# Patient Record
Sex: FEMALE | Race: WHITE | NOT HISPANIC OR LATINO | Employment: FULL TIME | URBAN - METROPOLITAN AREA
[De-identification: names, ages, dates, MRNs, and addresses within clinical notes are randomized per-mention and may not be internally consistent; named-entity substitution may affect disease eponyms.]

---

## 2024-11-06 ENCOUNTER — OFFICE VISIT (OUTPATIENT)
Dept: INTERNAL MEDICINE | Facility: CLINIC | Age: 29
End: 2024-11-06
Payer: COMMERCIAL

## 2024-11-06 ENCOUNTER — LAB VISIT (OUTPATIENT)
Dept: LAB | Facility: HOSPITAL | Age: 29
End: 2024-11-06
Attending: FAMILY MEDICINE
Payer: COMMERCIAL

## 2024-11-06 VITALS
OXYGEN SATURATION: 97 % | SYSTOLIC BLOOD PRESSURE: 106 MMHG | WEIGHT: 148.81 LBS | DIASTOLIC BLOOD PRESSURE: 80 MMHG | HEIGHT: 67 IN | HEART RATE: 77 BPM | BODY MASS INDEX: 23.36 KG/M2

## 2024-11-06 DIAGNOSIS — Z13.220 SCREENING FOR LIPOID DISORDERS: ICD-10-CM

## 2024-11-06 DIAGNOSIS — Z00.00 ROUTINE GENERAL MEDICAL EXAMINATION AT A HEALTH CARE FACILITY: Primary | ICD-10-CM

## 2024-11-06 DIAGNOSIS — Z13.29 THYROID DISORDER SCREEN: ICD-10-CM

## 2024-11-06 DIAGNOSIS — Z13.1 DIABETES MELLITUS SCREENING: ICD-10-CM

## 2024-11-06 DIAGNOSIS — G25.3 MYOCLONUS: ICD-10-CM

## 2024-11-06 DIAGNOSIS — G43.009 MIGRAINE WITHOUT AURA AND WITHOUT STATUS MIGRAINOSUS, NOT INTRACTABLE: ICD-10-CM

## 2024-11-06 DIAGNOSIS — Z11.59 NEED FOR HEPATITIS C SCREENING TEST: ICD-10-CM

## 2024-11-06 DIAGNOSIS — G40.909 NONINTRACTABLE EPILEPSY WITHOUT STATUS EPILEPTICUS, UNSPECIFIED EPILEPSY TYPE: ICD-10-CM

## 2024-11-06 DIAGNOSIS — L40.9 PSORIASIS: ICD-10-CM

## 2024-11-06 DIAGNOSIS — Z00.00 ROUTINE GENERAL MEDICAL EXAMINATION AT A HEALTH CARE FACILITY: ICD-10-CM

## 2024-11-06 LAB
ALBUMIN SERPL BCP-MCNC: 4.1 G/DL (ref 3.5–5.2)
ALP SERPL-CCNC: 35 U/L (ref 40–150)
ALT SERPL W/O P-5'-P-CCNC: 20 U/L (ref 10–44)
ANION GAP SERPL CALC-SCNC: 13 MMOL/L (ref 8–16)
AST SERPL-CCNC: 22 U/L (ref 10–40)
BASOPHILS # BLD AUTO: 0.05 K/UL (ref 0–0.2)
BASOPHILS NFR BLD: 0.7 % (ref 0–1.9)
BILIRUB SERPL-MCNC: 0.4 MG/DL (ref 0.1–1)
BUN SERPL-MCNC: 16 MG/DL (ref 6–20)
CALCIUM SERPL-MCNC: 10.4 MG/DL (ref 8.7–10.5)
CHLORIDE SERPL-SCNC: 103 MMOL/L (ref 95–110)
CHOLEST SERPL-MCNC: 216 MG/DL (ref 120–199)
CHOLEST/HDLC SERPL: 2.4 {RATIO} (ref 2–5)
CO2 SERPL-SCNC: 24 MMOL/L (ref 23–29)
CREAT SERPL-MCNC: 0.8 MG/DL (ref 0.5–1.4)
DIFFERENTIAL METHOD BLD: NORMAL
EOSINOPHIL # BLD AUTO: 0.1 K/UL (ref 0–0.5)
EOSINOPHIL NFR BLD: 1.3 % (ref 0–8)
ERYTHROCYTE [DISTWIDTH] IN BLOOD BY AUTOMATED COUNT: 12.3 % (ref 11.5–14.5)
EST. GFR  (NO RACE VARIABLE): >60 ML/MIN/1.73 M^2
ESTIMATED AVG GLUCOSE: 100 MG/DL (ref 68–131)
GLUCOSE SERPL-MCNC: 84 MG/DL (ref 70–110)
HBA1C MFR BLD: 5.1 % (ref 4–5.6)
HCT VFR BLD AUTO: 42.7 % (ref 37–48.5)
HCV AB SERPL QL IA: NORMAL
HDLC SERPL-MCNC: 91 MG/DL (ref 40–75)
HDLC SERPL: 42.1 % (ref 20–50)
HGB BLD-MCNC: 14.1 G/DL (ref 12–16)
IMM GRANULOCYTES # BLD AUTO: 0.02 K/UL (ref 0–0.04)
IMM GRANULOCYTES NFR BLD AUTO: 0.3 % (ref 0–0.5)
LDLC SERPL CALC-MCNC: 112.2 MG/DL (ref 63–159)
LYMPHOCYTES # BLD AUTO: 2.5 K/UL (ref 1–4.8)
LYMPHOCYTES NFR BLD: 34.8 % (ref 18–48)
MCH RBC QN AUTO: 29.1 PG (ref 27–31)
MCHC RBC AUTO-ENTMCNC: 33 G/DL (ref 32–36)
MCV RBC AUTO: 88 FL (ref 82–98)
MONOCYTES # BLD AUTO: 0.5 K/UL (ref 0.3–1)
MONOCYTES NFR BLD: 7.1 % (ref 4–15)
NEUTROPHILS # BLD AUTO: 3.9 K/UL (ref 1.8–7.7)
NEUTROPHILS NFR BLD: 55.8 % (ref 38–73)
NONHDLC SERPL-MCNC: 125 MG/DL
NRBC BLD-RTO: 0 /100 WBC
PLATELET # BLD AUTO: 336 K/UL (ref 150–450)
PMV BLD AUTO: 10.3 FL (ref 9.2–12.9)
POTASSIUM SERPL-SCNC: 4.5 MMOL/L (ref 3.5–5.1)
PROT SERPL-MCNC: 8 G/DL (ref 6–8.4)
RBC # BLD AUTO: 4.84 M/UL (ref 4–5.4)
SODIUM SERPL-SCNC: 140 MMOL/L (ref 136–145)
TRIGL SERPL-MCNC: 64 MG/DL (ref 30–150)
TSH SERPL DL<=0.005 MIU/L-ACNC: 0.8 UIU/ML (ref 0.4–4)
WBC # BLD AUTO: 7.06 K/UL (ref 3.9–12.7)

## 2024-11-06 PROCEDURE — 3008F BODY MASS INDEX DOCD: CPT | Mod: CPTII,S$GLB,, | Performed by: FAMILY MEDICINE

## 2024-11-06 PROCEDURE — 1160F RVW MEDS BY RX/DR IN RCRD: CPT | Mod: CPTII,S$GLB,, | Performed by: FAMILY MEDICINE

## 2024-11-06 PROCEDURE — 3044F HG A1C LEVEL LT 7.0%: CPT | Mod: CPTII,S$GLB,, | Performed by: FAMILY MEDICINE

## 2024-11-06 PROCEDURE — 99385 PREV VISIT NEW AGE 18-39: CPT | Mod: S$GLB,,, | Performed by: FAMILY MEDICINE

## 2024-11-06 PROCEDURE — 85025 COMPLETE CBC W/AUTO DIFF WBC: CPT | Performed by: FAMILY MEDICINE

## 2024-11-06 PROCEDURE — 80061 LIPID PANEL: CPT | Performed by: FAMILY MEDICINE

## 2024-11-06 PROCEDURE — 80053 COMPREHEN METABOLIC PANEL: CPT | Performed by: FAMILY MEDICINE

## 2024-11-06 PROCEDURE — 83036 HEMOGLOBIN GLYCOSYLATED A1C: CPT | Performed by: FAMILY MEDICINE

## 2024-11-06 PROCEDURE — 99999 PR PBB SHADOW E&M-EST. PATIENT-LVL IV: CPT | Mod: PBBFAC,,, | Performed by: FAMILY MEDICINE

## 2024-11-06 PROCEDURE — 36415 COLL VENOUS BLD VENIPUNCTURE: CPT | Performed by: FAMILY MEDICINE

## 2024-11-06 PROCEDURE — 3079F DIAST BP 80-89 MM HG: CPT | Mod: CPTII,S$GLB,, | Performed by: FAMILY MEDICINE

## 2024-11-06 PROCEDURE — 3074F SYST BP LT 130 MM HG: CPT | Mod: CPTII,S$GLB,, | Performed by: FAMILY MEDICINE

## 2024-11-06 PROCEDURE — 1159F MED LIST DOCD IN RCRD: CPT | Mod: CPTII,S$GLB,, | Performed by: FAMILY MEDICINE

## 2024-11-06 PROCEDURE — 86803 HEPATITIS C AB TEST: CPT | Performed by: FAMILY MEDICINE

## 2024-11-06 PROCEDURE — 84443 ASSAY THYROID STIM HORMONE: CPT | Performed by: FAMILY MEDICINE

## 2024-11-06 RX ORDER — RIZATRIPTAN BENZOATE 10 MG/1
TABLET ORAL
COMMUNITY

## 2024-11-06 RX ORDER — IXEKIZUMAB 80 MG/ML
INJECTION, SOLUTION SUBCUTANEOUS
COMMUNITY

## 2024-11-06 NOTE — PROGRESS NOTES
Subjective:       Patient ID: Deana Mixon is a 29 y.o. female.    Chief Complaint: Annual Exam      History of Present Illness    Patient presents to clinic today for annual physical exam.  - Patient reports a history of psoriasis and is under the care of [facility]. She recently underwent TB testing in preparation for initiating Taltz (ixekizumab) treatment. Patient expresses concern about potential future health risks, particularly diabetes, due to a diagnosis of hypoglycemia at age 9. She has been reducing her caffeine intake, specifically energy drinks, due to adverse effects when consuming them without substantial food. Patient drinks V8 energy drinks and occasionally Edi Nu, with an average caffeine intake of 80 mg every other day or every 2 days. She notes that consumption on an empty stomach leads to adverse effects within 30 minutes to an hour. Patient has a history of migraines, which are currently well-controlled. She no longer requires preventative medication for migraines and only uses rizatriptan as needed. Patient discontinued Topamax due to side effects and has instead focused on identifying and managing her migraine triggers. She denies any current seizures, although she has a history of being at risk for them. She is followed by Neurology at [facility].  Patient is otherwise without concerns today.      Review of Systems   Constitutional:  Negative for activity change and unexpected weight change.   HENT:  Negative for hearing loss, rhinorrhea and trouble swallowing.    Eyes:  Negative for discharge and visual disturbance.   Respiratory:  Negative for chest tightness and wheezing.    Cardiovascular:  Negative for chest pain and palpitations.   Gastrointestinal:  Negative for blood in stool, constipation, diarrhea and vomiting.   Endocrine: Negative for polydipsia and polyuria.   Genitourinary:  Negative for difficulty  urinating, dysuria, hematuria and menstrual problem.   Musculoskeletal:  Negative for arthralgias, joint swelling and neck pain.   Neurological:  Negative for weakness and headaches.   Psychiatric/Behavioral:  Negative for confusion and dysphoric mood.          Objective:      Physical Exam  Vitals reviewed.   Constitutional:       General: She is not in acute distress.     Appearance: Normal appearance. She is well-developed.   HENT:      Head: Normocephalic and atraumatic.      Right Ear: Tympanic membrane, ear canal and external ear normal.      Left Ear: Tympanic membrane, ear canal and external ear normal.      Nose: Nose normal. No mucosal edema or rhinorrhea.      Mouth/Throat:      Pharynx: Uvula midline.   Eyes:      General: Lids are normal. No scleral icterus.     Extraocular Movements: Extraocular movements intact.      Conjunctiva/sclera: Conjunctivae normal.      Pupils: Pupils are equal, round, and reactive to light.   Neck:      Thyroid: No thyromegaly.   Cardiovascular:      Rate and Rhythm: Normal rate and regular rhythm.      Heart sounds: No murmur heard.     No friction rub. No gallop.   Pulmonary:      Effort: Pulmonary effort is normal.      Breath sounds: Normal breath sounds. No wheezing, rhonchi or rales.   Abdominal:      General: Bowel sounds are normal. There is no distension.      Palpations: Abdomen is soft. There is no mass.      Tenderness: There is no abdominal tenderness.   Musculoskeletal:         General: Normal range of motion.      Cervical back: Normal range of motion and neck supple.   Lymphadenopathy:      Cervical: No cervical adenopathy.   Skin:     General: Skin is warm and dry.      Findings: No lesion or rash.      Nails: There is no clubbing.   Neurological:      Mental Status: She is alert and oriented to person, place, and time.      Cranial Nerves: No cranial nerve deficit.      Sensory: No sensory deficit.      Gait: Gait normal.   Psychiatric:         Mood and  Affect: Mood and affect normal.         Assessment:       1. Routine general medical examination at a health care facility    2. Psoriasis    3. Nonintractable epilepsy without status epilepticus, unspecified epilepsy type    4. Myoclonus    5. Migraine without aura and without status migrainosus, not intractable    6. Thyroid disorder screen    7. Screening for lipoid disorders    8. Diabetes mellitus screening    9. Need for hepatitis C screening test        Plan:   1. Routine general medical examination at a health care facility  -     Comprehensive Metabolic Panel; Future; Expected date: 11/06/2024  -     CBC Auto Differential; Future; Expected date: 11/06/2024    2. Psoriasis  Overview:  Followed by Dermatology, Dr. Braxton @ [facility], continue current treatment plan       3. Nonintractable epilepsy without status epilepticus, unspecified epilepsy type  Overview:  Followed by Neurology @ [facility], continue current treatment plan     Patient denies seizure activity, but reports abnormal EEG in past      4. Myoclonus  Overview:  Followed by Neurology @ [facility], continue current treatment plan       5. Migraine without aura and without status migrainosus, not intractable  Overview:  Followed by Neurology @ [facility], continue current treatment plan       6. Thyroid disorder screen  -     TSH; Future; Expected date: 11/06/2024    7. Screening for lipoid disorders  -     Lipid Panel; Future; Expected date: 11/06/2024    8. Diabetes mellitus screening  -     Hemoglobin A1C; Future; Expected date: 11/06/2024    9. Need for hepatitis C screening test  -     Hepatitis C Antibody; Future; Expected date: 11/06/2024      PREVENTIVE CARE:   Ordered comprehensive labs.   Inquired about the patient's flu and COVID vaccination status.   Inquired about the patient's most recent Pap smear.   Provided the patient with information on maintaining a healthy  "lifestyle.    BLOOD SUGAR MANAGEMENT:   Evaluated patient's caffeine and energy drink consumption, considering its impact on blood sugar.   Explained the impact of sugary drinks, including those with natural sugars, on blood sugar.   Discussed the effect of artificial sweeteners on insulin response, even in calorie-free drinks.   Educated on the principles of intermittent fasting and the importance of avoiding sweetened beverages during fasting periods.   Patient to consume energy drinks with substantial meals to prevent blood sugar crashes.   Recommend avoiding caloric beverages between meals to prevent insulin spikes.   Patient can drink water, black tea, or black coffee between meals instead of sweetened beverages.    OTHER INSTRUCTIONS:   Provided information on the "6 pillars of a healthy lifestyle" via a document link in the discharge instructions.    Patient expressed understanding and agreement with plan.  Female health screenings per OB/GYN- Dr. Roberts, Release signed for records.    Health Maintenance reviewed/updated.    Follow up in about 1 year (around 11/6/2025), or if symptoms worsen or fail to improve, for annual with Sasha HITCHCOCK, labs today, NIKIA:pap (Dr. Becky Roberts).    This note was generated with the assistance of ambient listening technology. I attest to having reviewed and edited the generated note for accuracy, though some syntax or spelling errors may persist. Please contact the author of this note for any clarification.    "

## 2024-12-09 ENCOUNTER — PATIENT OUTREACH (OUTPATIENT)
Dept: ADMINISTRATIVE | Facility: HOSPITAL | Age: 29
End: 2024-12-09
Payer: COMMERCIAL

## 2024-12-09 NOTE — LETTER
AUTHORIZATION FOR RELEASE OF   CONFIDENTIAL INFORMATION    Dear Dr. Roberts,    We are seeing Deana Mixon, date of birth 1995, in the clinic at [facility]. Lucita Kothari MD is the patient's PCP. Deana Mixon has an outstanding lab/procedure at the time we reviewed her chart. In order to help keep her health information updated, she has authorized us to request the following medical record(s):        (  )  MAMMOGRAM                                      (  )  COLONOSCOPY      ( x )  PAP SMEAR                                          (  )  OUTSIDE LAB RESULTS     (  )  DEXA SCAN                                          (  )  EYE EXAM            (  )  FOOT EXAM                                          (  )  ENTIRE RECORD     (  )  OUTSIDE IMMUNIZATIONS                 (  )  _______________       Please fax records to [facility] @ [phone].     If you have any questions, please contact [facility]  [phone]            Patient Name: Deana Mixon  : 1995  Patient Phone #: [phone]

## 2025-07-31 ENCOUNTER — E-VISIT (OUTPATIENT)
Dept: INTERNAL MEDICINE | Facility: CLINIC | Age: 30
End: 2025-07-31
Payer: COMMERCIAL

## 2025-07-31 DIAGNOSIS — J34.89 NASAL SORE: Primary | ICD-10-CM

## 2025-07-31 NOTE — PROGRESS NOTES
"Patient ID: Deana Mixon is a 30 y.o. female.        E-Visit Time Tracking:   Day 1 Time (in minutes): 5  Total Time (in minutes): 5      Chief Complaint: General Illness (Entered automatically based on patient selection in [email or web address].)      The patient initiated a request through [email or web address] on 7/31/2025 for evaluation and management with a chief complaint of General Illness (Entered automatically based on patient selection in [email or web address].)     I evaluated the questionnaire submission on 07/31/2025.    [facility] Evisit [facility]-Common Problems    7/31/2025  5:04 PM CDT - Filed by Patient   What do you need help with? Other Concern   Do you agree to participate in an E-Visit? Yes   If you have any of the following symptoms, please go to the nearest emergency room or call 911: I acknowledge   Do you have any of the following pregnancy-related conditions? (Pregnant, Possibly pregnant, Breast feeding, None) None   What is the main issue you would like addressed today? Sore/painful nose   Please describe your symptoms. Started 2 months ago, hard/crusty boogers causing bleeding, sores, red and inflamed, painful to breath sometimes. I've tried nose sprays and putting Vaseline inside my nose with no real relief. Started out of no where no other "sinus" related problems.   Where is your problem located? Inside of nose   On a scale of 1-10, where 10 is the worst you can imagine, how severe are your symptoms? (range: 1 - 10) 4   Have you had these symptoms before? No   How long have you been having these symptoms? (Just today, For a few days, For a week, For one to four weeks, For more than a month) More than a month   What helps with your symptoms? Vaseline inside my nose. Only temporary relief   What makes your symptoms feel worse? Nothing makes it worse. It's just always happening 24/7 and its getting annoying and painful.   Are these symptoms related to a condition that you currently have? (Yes, No, Not sure) No   Please " "describe any probable cause for your symptoms. No idea   Provide any information you feel is important to your history not asked above No other "sinus/allergies" symptoms have happened before, during or after this started.   Please attach any relevant images or files    Are you able to take your vitals? No         Encounter Diagnosis   Name Primary?    Nasal sore Yes        Orders Placed This Encounter   Procedures    Ambulatory referral/consult to ENT     Standing Status:   Future     Expected Date:   8/7/2025     Expiration Date:   8/31/2026     Referral Priority:   Routine     Referral Type:   Consultation     Referral Reason:   Specialty Services Required     Requested Specialty:   Otolaryngology     Number of Visits Requested:   1            Follow up if symptoms worsen or fail to improve, for keep routine follow up.                          "

## 2025-08-07 ENCOUNTER — OFFICE VISIT (OUTPATIENT)
Dept: OTOLARYNGOLOGY | Facility: CLINIC | Age: 30
End: 2025-08-07
Payer: COMMERCIAL

## 2025-08-07 VITALS — BODY MASS INDEX: 23.31 KG/M2 | HEIGHT: 67 IN

## 2025-08-07 DIAGNOSIS — J34.89 NASAL SORE: ICD-10-CM

## 2025-08-07 DIAGNOSIS — J34.89 NASAL VESTIBULITIS: Primary | ICD-10-CM

## 2025-08-07 PROCEDURE — 1159F MED LIST DOCD IN RCRD: CPT | Mod: CPTII,S$GLB,, | Performed by: OTOLARYNGOLOGY

## 2025-08-07 PROCEDURE — 99204 OFFICE O/P NEW MOD 45 MIN: CPT | Mod: S$GLB,,, | Performed by: OTOLARYNGOLOGY

## 2025-08-07 PROCEDURE — 1160F RVW MEDS BY RX/DR IN RCRD: CPT | Mod: CPTII,S$GLB,, | Performed by: OTOLARYNGOLOGY

## 2025-08-07 PROCEDURE — 3008F BODY MASS INDEX DOCD: CPT | Mod: CPTII,S$GLB,, | Performed by: OTOLARYNGOLOGY

## 2025-08-07 PROCEDURE — 99999 PR PBB SHADOW E&M-EST. PATIENT-LVL III: CPT | Mod: PBBFAC,,, | Performed by: OTOLARYNGOLOGY

## 2025-08-07 RX ORDER — MUPIROCIN 20 MG/G
OINTMENT TOPICAL 2 TIMES DAILY
Qty: 15 G | Refills: 3 | Status: SHIPPED | OUTPATIENT
Start: 2025-08-07 | End: 2025-08-21

## 2025-08-07 NOTE — PROGRESS NOTES
Referring Provider:    Lucita Kothari Md  [id number] [place] [address]  Subjective:   Patient: Deana Mixon [id number], :1995   Visit date:2025 3:18 PM    Chief Complaint:  Other (Nasal soreness, irriataion ongoing for about 2 months. Patient states it is bilateral nostrils that are crusted, sore, and sometimes bleeding. She thought it was sinus.)    HPI:    Prior notes reviewed by myself.  Clinical documentation obtained by nursing staff reviewed.     History of Present Illness    CHIEF COMPLAINT:  Patient presents today with nasal soreness and crusting.    HISTORY OF PRESENT ILLNESS:  She reports persistent nasal symptoms including build-up of hard, crusty mucus with associated sores and inflammation around nasal openings and internally. She describes ongoing redness and inflammation in the nasal passages with significant difficulty breathing through her nose, particularly noted during nighttime with episodes of blocked nasal passages and reduced airflow. She reports struggling to get adequate air through her nasal passages. Symptoms are constant and not intermittent. She has previously attempted multiple treatments including nasal saline spray and an unspecified medicated nasal spray. She currently uses Vaseline applied to internal nasal walls for temporary relief.    MEDICAL HISTORY:  She has seasonal sinus problems characterized by minor symptoms that resolve within one week. She has psoriasis managed with Taltz 80 mg.      ROS:  General: -fever, -chills, -fatigue, -weight gain, -weight loss  Eyes: -vision changes, -redness, -discharge  ENT: -ear pain, +nasal congestion, -sore throat, +nasal irritation, +nasal discharge, +difficulty breathing, +mouth breathing, +blockage or obstruction  Cardiovascular: -chest pain, -palpitations, -lower extremity edema  Respiratory: -cough, -shortness of breath  Gastrointestinal: -abdominal pain, -nausea, -vomiting, -diarrhea,  "-constipation, -blood in stool  Genitourinary: -dysuria, -hematuria, -frequency  Musculoskeletal: -joint pain, -muscle pain  Skin: -rash, -lesion  Neurological: -headache, -dizziness, -numbness, -tingling  Psychiatric: -anxiety, -depression, -sleep difficulty         Objective:     Physical Exam:  Vitals:  Ht 5' 7" (1.702 m)   BMI 23.31 kg/m²   General appearance:  Well developed, well nourished    Ears:  Otoscopy of external auditory canals and tympanic membranes was normal, clinical speech reception thresholds grossly intact, no mass/lesion of auricle.    Nose:  No masses/lesions of external nose, nasal mucosa with yellow crusting, scabbing and thick mucous anterior nasal vestibule bilaterally, septum, and turbinates were within normal limits.    Mouth:  No mass/lesion of lips, teeth, gums, hard/soft palate, tongue, tonsils, or oropharynx.    Neck & Lymphatics:  No cervical lymphadenopathy, no neck mass/crepitus/ asymmetry, trachea is midline, no thyroid enlargement/tenderness/mass.        [x]  Data Reviewed:    Lab Results   Component Value Date    WBC 7.06 11/06/2024    HGB 14.1 11/06/2024    HCT 42.7 11/06/2024    MCV 88 11/06/2024    EOSINOPHIL 1.3 11/06/2024             Assessment & Plan:   Nasal vestibulitis  -     mupirocin (BACTROBAN) 2 % ointment; by Nasal route 2 (two) times daily. Apply to nose twice daily for 14 days  Dispense: 15 g; Refill: 3    Nasal sore  -     Ambulatory referral/consult to ENT        Assessment & Plan        IMPRESSION:  - Diagnosed nasal vestibulitis, likely due to staph colonization.  - Work environment and healthcare exposure contributing factors to staph presence.  - Determined Mupirocin as first-line treatment.  - Potential link between Taltz (biologic) use and increased susceptibility to infection.  - Assessed severity and decided against oral antibiotics at this time.    NASAL SORE:  - Started Mupirocin ointment, to be applied twice daily for 2 weeks.  - Apply a glob of " Mupirocin on a Q-tip and go around the nostril completely.  - Provided multiple refills.    OTHER STAPHYLOCOCCUS AS THE CAUSE OF DISEASES CLASSIFIED ELSEWHERE:  - Explained staph colonization in nose is common, especially among healthcare workers and those frequently exposed to diverse populations.  - Discussed that some individuals can coexist with staph without issues, while others may experience recurrent infections.  - Informed patient about the possibility of recurring staph colonization problems, even after successful treatment.         Rosalio Alaniz M.D.  Department of Otolaryngology - Head & Neck Surgery  [address]  [place] [address]  P: [phone]  F: [phone]        DISCLAIMER: This note was prepared with [employer] voice recognition transcription software. Garbled syntax, mangled pronouns, and other bizarre constructions may be attributed to that software system. While efforts were made to correct any mistakes made by this voice recognition program, some errors and/or omissions may remain in the note that were missed when the note was originally created.     This note was generated with the assistance of ambient listening technology. Verbal consent was obtained by the patient and accompanying visitor(s) for the recording of patient appointment to facilitate this note. I attest to having reviewed and edited the generated note for accuracy, though some syntax or spelling errors may persist. Please contact the author of this note for any clarification.